# Patient Record
Sex: FEMALE | Race: ASIAN | Employment: FULL TIME | URBAN - METROPOLITAN AREA
[De-identification: names, ages, dates, MRNs, and addresses within clinical notes are randomized per-mention and may not be internally consistent; named-entity substitution may affect disease eponyms.]

---

## 2024-07-10 DIAGNOSIS — E03.9 HYPOTHYROIDISM, UNSPECIFIED TYPE: ICD-10-CM

## 2024-07-10 DIAGNOSIS — I10 HYPERTENSION, UNSPECIFIED TYPE: Primary | ICD-10-CM

## 2024-07-22 RX ORDER — AMLODIPINE BESYLATE 5 MG/1
5 TABLET ORAL DAILY
Qty: 90 TABLET | Refills: 1 | Status: SHIPPED | OUTPATIENT
Start: 2024-07-22 | End: 2024-07-26

## 2024-07-22 RX ORDER — LEVOTHYROXINE SODIUM 75 UG/1
75 TABLET ORAL DAILY
Qty: 90 TABLET | Refills: 1 | Status: SHIPPED | OUTPATIENT
Start: 2024-07-22 | End: 2024-07-26

## 2024-07-25 DIAGNOSIS — I10 HYPERTENSION, UNSPECIFIED TYPE: ICD-10-CM

## 2024-07-25 DIAGNOSIS — E03.9 HYPOTHYROIDISM, UNSPECIFIED TYPE: ICD-10-CM

## 2024-07-25 RX ORDER — LEVOTHYROXINE SODIUM 75 UG/1
75 TABLET ORAL DAILY
Qty: 90 TABLET | Refills: 1 | OUTPATIENT
Start: 2024-07-25

## 2024-07-25 RX ORDER — AMLODIPINE BESYLATE 5 MG/1
5 TABLET ORAL DAILY
Qty: 90 TABLET | Refills: 1 | OUTPATIENT
Start: 2024-07-25

## 2024-07-25 NOTE — TELEPHONE ENCOUNTER
Patient is scheduled.  It shows in her chart that we sent these in but patient called stating that [facility] did not have the RX's.  I called [facility] and they verified that they did not receive the prescriptions so can you please resend.

## 2024-07-26 DIAGNOSIS — E03.9 HYPOTHYROIDISM, UNSPECIFIED TYPE: ICD-10-CM

## 2024-07-26 DIAGNOSIS — I10 HYPERTENSION, UNSPECIFIED TYPE: ICD-10-CM

## 2024-07-26 RX ORDER — LEVOTHYROXINE SODIUM 75 UG/1
75 TABLET ORAL DAILY
Qty: 90 TABLET | Refills: 1 | Status: CANCELLED | OUTPATIENT
Start: 2024-07-26

## 2024-07-26 RX ORDER — AMLODIPINE BESYLATE 5 MG/1
5 TABLET ORAL DAILY
Qty: 90 TABLET | Refills: 0 | Status: SHIPPED | OUTPATIENT
Start: 2024-07-26

## 2024-07-26 RX ORDER — LEVOTHYROXINE SODIUM 75 UG/1
75 TABLET ORAL DAILY
Qty: 90 TABLET | Refills: 0 | Status: SHIPPED | OUTPATIENT
Start: 2024-07-26

## 2024-07-26 RX ORDER — AMLODIPINE BESYLATE 5 MG/1
5 TABLET ORAL DAILY
Qty: 90 TABLET | Refills: 1 | Status: CANCELLED | OUTPATIENT
Start: 2024-07-26

## 2024-07-26 NOTE — TELEPHONE ENCOUNTER
Can you please pend Amlodipine and levothyroxine to Doc please.  I went back and talked with him and he said to pend to him again.

## 2024-12-16 ENCOUNTER — TELEPHONE (OUTPATIENT)
Dept: PRIMARY CARE | Facility: CLINIC | Age: 64
End: 2024-12-16
Payer: COMMERCIAL

## 2024-12-16 DIAGNOSIS — I10 HYPERTENSION, UNSPECIFIED TYPE: ICD-10-CM

## 2024-12-16 DIAGNOSIS — Z00.00 WELLNESS EXAMINATION: ICD-10-CM

## 2024-12-16 DIAGNOSIS — R73.03 PRE-DIABETES: Primary | ICD-10-CM

## 2024-12-16 DIAGNOSIS — E11.9 TYPE 2 DIABETES MELLITUS WITHOUT COMPLICATION, WITHOUT LONG-TERM CURRENT USE OF INSULIN (MULTI): ICD-10-CM

## 2024-12-18 ENCOUNTER — LAB (OUTPATIENT)
Dept: LAB | Facility: LAB | Age: 64
End: 2024-12-18
Payer: COMMERCIAL

## 2024-12-18 DIAGNOSIS — I10 HYPERTENSION, UNSPECIFIED TYPE: ICD-10-CM

## 2024-12-18 DIAGNOSIS — Z00.00 WELLNESS EXAMINATION: ICD-10-CM

## 2024-12-18 DIAGNOSIS — E11.9 TYPE 2 DIABETES MELLITUS WITHOUT COMPLICATION, WITHOUT LONG-TERM CURRENT USE OF INSULIN (MULTI): ICD-10-CM

## 2024-12-18 DIAGNOSIS — R73.03 PRE-DIABETES: ICD-10-CM

## 2024-12-18 LAB
ALBUMIN SERPL BCP-MCNC: 4.2 G/DL (ref 3.4–5)
ALP SERPL-CCNC: 67 U/L (ref 33–136)
ALT SERPL W P-5'-P-CCNC: 22 U/L (ref 7–45)
ANION GAP SERPL CALC-SCNC: 13 MMOL/L (ref 10–20)
AST SERPL W P-5'-P-CCNC: 18 U/L (ref 9–39)
BILIRUB SERPL-MCNC: 0.7 MG/DL (ref 0–1.2)
BUN SERPL-MCNC: 20 MG/DL (ref 6–23)
CALCIUM SERPL-MCNC: 9.3 MG/DL (ref 8.6–10.3)
CHLORIDE SERPL-SCNC: 105 MMOL/L (ref 98–107)
CHOLEST SERPL-MCNC: 220 MG/DL (ref 0–199)
CHOLESTEROL/HDL RATIO: 3.5
CO2 SERPL-SCNC: 27 MMOL/L (ref 21–32)
CREAT SERPL-MCNC: 0.7 MG/DL (ref 0.5–1.05)
EGFRCR SERPLBLD CKD-EPI 2021: >90 ML/MIN/1.73M*2
ERYTHROCYTE [DISTWIDTH] IN BLOOD BY AUTOMATED COUNT: 13.6 % (ref 11.5–14.5)
EST. AVERAGE GLUCOSE BLD GHB EST-MCNC: 108 MG/DL
GLUCOSE SERPL-MCNC: 91 MG/DL (ref 74–99)
HBA1C MFR BLD: 5.4 %
HCT VFR BLD AUTO: 49.9 % (ref 36–46)
HDLC SERPL-MCNC: 62.6 MG/DL
HGB BLD-MCNC: 15.8 G/DL (ref 12–16)
LDLC SERPL CALC-MCNC: 133 MG/DL
MCH RBC QN AUTO: 27.6 PG (ref 26–34)
MCHC RBC AUTO-ENTMCNC: 31.7 G/DL (ref 32–36)
MCV RBC AUTO: 87 FL (ref 80–100)
NON HDL CHOLESTEROL: 157 MG/DL (ref 0–149)
NRBC BLD-RTO: 0 /100 WBCS (ref 0–0)
PLATELET # BLD AUTO: 251 X10*3/UL (ref 150–450)
POTASSIUM SERPL-SCNC: 3.7 MMOL/L (ref 3.5–5.3)
PROT SERPL-MCNC: 7.3 G/DL (ref 6.4–8.2)
RBC # BLD AUTO: 5.73 X10*6/UL (ref 4–5.2)
SODIUM SERPL-SCNC: 141 MMOL/L (ref 136–145)
TRIGL SERPL-MCNC: 124 MG/DL (ref 0–149)
TSH SERPL-ACNC: 0.93 MIU/L (ref 0.44–3.98)
VLDL: 25 MG/DL (ref 0–40)
WBC # BLD AUTO: 6.1 X10*3/UL (ref 4.4–11.3)

## 2024-12-18 PROCEDURE — 80053 COMPREHEN METABOLIC PANEL: CPT

## 2024-12-18 PROCEDURE — 36415 COLL VENOUS BLD VENIPUNCTURE: CPT

## 2024-12-18 PROCEDURE — 85027 COMPLETE CBC AUTOMATED: CPT

## 2024-12-18 PROCEDURE — 80061 LIPID PANEL: CPT

## 2024-12-18 PROCEDURE — 84443 ASSAY THYROID STIM HORMONE: CPT

## 2024-12-18 PROCEDURE — 83036 HEMOGLOBIN GLYCOSYLATED A1C: CPT

## 2024-12-19 ENCOUNTER — APPOINTMENT (OUTPATIENT)
Dept: PRIMARY CARE | Facility: CLINIC | Age: 64
End: 2024-12-19
Payer: COMMERCIAL

## 2024-12-19 VITALS
HEART RATE: 76 BPM | SYSTOLIC BLOOD PRESSURE: 124 MMHG | HEIGHT: 61 IN | WEIGHT: 168 LBS | DIASTOLIC BLOOD PRESSURE: 74 MMHG | OXYGEN SATURATION: 95 % | BODY MASS INDEX: 31.72 KG/M2

## 2024-12-19 DIAGNOSIS — Z12.4 PAP SMEAR FOR CERVICAL CANCER SCREENING: ICD-10-CM

## 2024-12-19 DIAGNOSIS — Z00.00 ANNUAL PHYSICAL EXAM: Primary | ICD-10-CM

## 2024-12-19 DIAGNOSIS — Z12.31 ENCOUNTER FOR SCREENING MAMMOGRAM FOR MALIGNANT NEOPLASM OF BREAST: ICD-10-CM

## 2024-12-19 PROCEDURE — 99396 PREV VISIT EST AGE 40-64: CPT

## 2024-12-19 PROCEDURE — 3008F BODY MASS INDEX DOCD: CPT

## 2024-12-19 ASSESSMENT — ENCOUNTER SYMPTOMS
CONFUSION: 0
NERVOUS/ANXIOUS: 0
HEMATURIA: 0
BLOOD IN STOOL: 0

## 2024-12-19 ASSESSMENT — PATIENT HEALTH QUESTIONNAIRE - PHQ9
2. FEELING DOWN, DEPRESSED OR HOPELESS: NOT AT ALL
SUM OF ALL RESPONSES TO PHQ9 QUESTIONS 1 AND 2: 0
1. LITTLE INTEREST OR PLEASURE IN DOING THINGS: NOT AT ALL

## 2024-12-19 NOTE — PROGRESS NOTES
Chief Complaint   Reny Dangelo is a 64 y.o. female who presents for annual physical.   Patient denies any recent hospitalizations, surgeries or ER visits.  Denies any complaints or concerns today.  ROS reviewed below.    ROS:  Review of Systems   Constitutional:  Negative for chills, fatigue and fever.   HENT:  Negative for congestion, ear pain and rhinorrhea.    Eyes:  Negative for visual disturbance.   Respiratory:  Negative for cough, chest tightness, shortness of breath and wheezing.    Cardiovascular:  Negative for chest pain, palpitations and leg swelling.   Gastrointestinal:  Negative for abdominal pain, blood in stool, constipation and diarrhea.   Genitourinary:  Negative for dysuria, frequency, hematuria and urgency.   Musculoskeletal:  Negative for joint swelling.   Skin:  Negative for rash.   Neurological:  Negative for syncope, weakness, numbness and headaches.   Psychiatric/Behavioral:  Negative for confusion. The patient is not nervous/anxious.          PMH:  No past medical history on file.      PSH/Trauma:  No past surgical history on file.      Meds:    Current Outpatient Medications:     amLODIPine (Norvasc) 5 mg tablet, TAKE ONE TABLET BY MOUTH DAILY, Disp: 90 tablet, Rfl: 0    levothyroxine (Synthroid, Levoxyl) 75 mcg tablet, TAKE ONE TABLET BY MOUTH DAILY, Disp: 90 tablet, Rfl: 0    Allergies:  Allergies   Allergen Reactions    Penicillins Unknown       SH:  Social Drivers of Health     Tobacco Use: Low Risk  (12/19/2024)    Patient History     Smoking Tobacco Use: Never     Smokeless Tobacco Use: Never     Passive Exposure: Not on file   Alcohol Use: Not on file   Financial Resource Strain: Not on file   Food Insecurity: Not on file   Transportation Needs: Not on file   Physical Activity: Not on file   Stress: Not on file   Social Connections: Not on file   Intimate Partner Violence: Not on file   Depression: Not at risk (12/19/2024)    PHQ-2     PHQ-2 Score: 0   Housing Stability:  "Not on file   Utilities: Not on file   Digital Equity: Not on file   Health Literacy: Not on file       FH:  No family history on file.     Preventatives:  Colonoscopy- Due 2026  Mammogram- Due  Papsmear- Due    Health Maintenance   Topic Date Due    Yearly Adult Physical  Never done    HIV Screening  Never done    MMR Vaccines (1 of 1 - Standard series) Never done    Diabetes: Retinopathy Screening  Never done    Hepatitis C Screening  Never done    Diabetes: Urine Protein Screening  Never done    Pneumococcal Vaccine (1 of 2 - PCV) Never done    DTaP/Tdap/Td Vaccines (1 - Tdap) Never done    Zoster Vaccines (1 of 2) Never done    Cervical Cancer Screening  07/17/2011    Mammogram  11/17/2018    Bone Density Scan  01/10/2024    Influenza Vaccine (1) 09/01/2024    COVID-19 Vaccine (2 - 2024-25 season) 09/01/2024    Diabetes: Hemoglobin A1C  03/18/2025    TSH Level  12/18/2025    Lipid Panel  12/18/2025    Colorectal Cancer Screening  12/15/2026    RSV High Risk: (Elderly (60+) or Pregnant Population) (1 - 1-dose 75+ series) 06/03/2035    HIB Vaccines  Aged Out    Hepatitis B Vaccines  Aged Out    IPV Vaccines  Aged Out    Hepatitis A Vaccines  Aged Out    Meningococcal Vaccine  Aged Out    Rotavirus Vaccines  Aged Out    HPV Vaccines  Aged Out     ASCVD risk score The 10-year ASCVD risk score (Sumeet BELL, et al., 2019) is: 11.8%    Values used to calculate the score:      Age: 64 years      Sex: Female      Is Non- : No      Diabetic: Yes      Tobacco smoker: No      Systolic Blood Pressure: 124 mmHg      Is BP treated: Yes      HDL Cholesterol: 62.6 mg/dL      Total Cholesterol: 220 mg/dL    PE:  Visit Vitals  /74   Pulse 76   Ht 1.549 m (5' 1\")   Wt 76.2 kg (168 lb)   SpO2 95%   BMI 31.74 kg/m²   Smoking Status Never   BSA 1.81 m²     Physical Exam  Constitutional:       Appearance: Normal appearance.   HENT:      Head: Normocephalic and atraumatic.   Eyes:      Extraocular Movements: " Extraocular movements intact.      Pupils: Pupils are equal, round, and reactive to light.   Cardiovascular:      Rate and Rhythm: Normal rate and regular rhythm.   Pulmonary:      Effort: Pulmonary effort is normal.      Breath sounds: Normal breath sounds.   Abdominal:      General: There is no distension.      Palpations: Abdomen is soft.      Tenderness: There is no abdominal tenderness. There is no guarding or rebound.   Musculoskeletal:         General: Normal range of motion.   Skin:     General: Skin is warm and dry.      Capillary Refill: Capillary refill takes less than 2 seconds.   Neurological:      General: No focal deficit present.      Mental Status: She is alert and oriented to person, place, and time. Mental status is at baseline.   Psychiatric:         Mood and Affect: Mood normal.         Behavior: Behavior normal.         Thought Content: Thought content normal.         Judgment: Judgment normal.         Assessment/Plan  Reny was seen today for annual exam.  Diagnoses and all orders for this visit:  Annual physical exam (Primary)  Encounter for screening mammogram for malignant neoplasm of breast  -     BI mammo bilateral screening tomosynthesis; Future  Pap smear for cervical cancer screening  -     Referral to Obstetrics / Gynecology; Future         Reny Dangelo is a 64 y.o. female who presents for annual physical. Medications were reviewed and refills were ordered and sent to preferred pharmacy as needed. Annual lab work orders were placed. Will follow up with results. Discussed appropriate annual screenings and placed orders per patients preference. Will follow up with patient in one year for annual physical or sooner if needed.    Patient was staffed with Dr. Syd Lawrence DO, PGY-3  Formerly [facility]

## 2024-12-21 ASSESSMENT — ENCOUNTER SYMPTOMS
PALPITATIONS: 0
RHINORRHEA: 0
DIARRHEA: 0
CHEST TIGHTNESS: 0
FEVER: 0
ABDOMINAL PAIN: 0
FATIGUE: 0
DYSURIA: 0
NUMBNESS: 0
CONSTIPATION: 0
HEADACHES: 0
WHEEZING: 0
CHILLS: 0
FREQUENCY: 0
WEAKNESS: 0
SHORTNESS OF BREATH: 0
COUGH: 0
JOINT SWELLING: 0

## 2024-12-23 NOTE — PROGRESS NOTES
I reviewed and examined the patient. I was present for the key exam elements, and I fully participated in the patient's care. I discussed the management of the care with the resident. I have personally reviewed the pertinent labs and imaging, as well as recent notes, with the patient. I have reviewed the note above and agree with the resident's medical decision making as documented in the resident's note, in addition to the following comments / findings:     Agree with the rest of the plan outlined below by resident physician. No red flags.      The patient understands and agrees to the assessment and plan of care. Patient has also agreed to follow up and comply with the treatment and evaluation as recommended today. Patient was instructed to call the office at [phone] should questions arise regarding their treatment or care.     Mann Velarde DO, FAOASM  Family Medicine   [address], [address]  [place] [address]     Mann Velarde DO

## 2024-12-24 ENCOUNTER — HOSPITAL ENCOUNTER (OUTPATIENT)
Dept: RADIOLOGY | Facility: HOSPITAL | Age: 64
Discharge: HOME | End: 2024-12-24
Payer: COMMERCIAL

## 2024-12-24 ENCOUNTER — HOSPITAL ENCOUNTER (OUTPATIENT)
Dept: RADIOLOGY | Facility: EXTERNAL LOCATION | Age: 64
Discharge: HOME | End: 2024-12-24

## 2024-12-24 VITALS — HEIGHT: 61 IN | BODY MASS INDEX: 31.15 KG/M2 | WEIGHT: 165 LBS

## 2024-12-24 DIAGNOSIS — Z12.31 ENCOUNTER FOR SCREENING MAMMOGRAM FOR MALIGNANT NEOPLASM OF BREAST: ICD-10-CM

## 2024-12-24 PROCEDURE — 77067 SCR MAMMO BI INCL CAD: CPT

## 2024-12-24 PROCEDURE — 77063 BREAST TOMOSYNTHESIS BI: CPT | Performed by: RADIOLOGY

## 2024-12-24 PROCEDURE — 77067 SCR MAMMO BI INCL CAD: CPT | Performed by: RADIOLOGY

## 2024-12-28 ENCOUNTER — APPOINTMENT (OUTPATIENT)
Dept: RADIOLOGY | Facility: HOSPITAL | Age: 64
End: 2024-12-28
Payer: COMMERCIAL

## 2024-12-28 ENCOUNTER — HOSPITAL ENCOUNTER (EMERGENCY)
Facility: HOSPITAL | Age: 64
Discharge: HOME | End: 2024-12-28
Payer: COMMERCIAL

## 2024-12-28 VITALS
RESPIRATION RATE: 18 BRPM | OXYGEN SATURATION: 95 % | HEIGHT: 61 IN | WEIGHT: 165 LBS | SYSTOLIC BLOOD PRESSURE: 168 MMHG | BODY MASS INDEX: 31.15 KG/M2 | DIASTOLIC BLOOD PRESSURE: 107 MMHG | TEMPERATURE: 97.2 F | HEART RATE: 77 BPM

## 2024-12-28 DIAGNOSIS — S52.121A CLOSED DISPLACED FRACTURE OF HEAD OF RIGHT RADIUS, INITIAL ENCOUNTER: Primary | ICD-10-CM

## 2024-12-28 PROCEDURE — 2500000004 HC RX 250 GENERAL PHARMACY W/ HCPCS (ALT 636 FOR OP/ED): Performed by: NURSE PRACTITIONER

## 2024-12-28 PROCEDURE — 2500000001 HC RX 250 WO HCPCS SELF ADMINISTERED DRUGS (ALT 637 FOR MEDICARE OP): Performed by: NURSE PRACTITIONER

## 2024-12-28 PROCEDURE — 99284 EMERGENCY DEPT VISIT MOD MDM: CPT | Mod: 25

## 2024-12-28 PROCEDURE — 29105 APPLICATION LONG ARM SPLINT: CPT | Performed by: NURSE PRACTITIONER

## 2024-12-28 PROCEDURE — 90471 IMMUNIZATION ADMIN: CPT | Performed by: NURSE PRACTITIONER

## 2024-12-28 PROCEDURE — 73080 X-RAY EXAM OF ELBOW: CPT | Mod: RT

## 2024-12-28 PROCEDURE — 29105 APPLICATION LONG ARM SPLINT: CPT | Mod: RT | Performed by: NURSE PRACTITIONER

## 2024-12-28 PROCEDURE — 73080 X-RAY EXAM OF ELBOW: CPT | Mod: RIGHT SIDE | Performed by: RADIOLOGY

## 2024-12-28 PROCEDURE — 73110 X-RAY EXAM OF WRIST: CPT | Mod: RIGHT SIDE | Performed by: RADIOLOGY

## 2024-12-28 PROCEDURE — 73110 X-RAY EXAM OF WRIST: CPT | Mod: RT

## 2024-12-28 PROCEDURE — 90715 TDAP VACCINE 7 YRS/> IM: CPT | Performed by: NURSE PRACTITIONER

## 2024-12-28 RX ORDER — OXYCODONE AND ACETAMINOPHEN 5; 325 MG/1; MG/1
1 TABLET ORAL ONCE
Status: COMPLETED | OUTPATIENT
Start: 2024-12-28 | End: 2024-12-28

## 2024-12-28 RX ORDER — HYDROCODONE BITARTRATE AND ACETAMINOPHEN 5; 325 MG/1; MG/1
1 TABLET ORAL EVERY 6 HOURS PRN
Qty: 12 TABLET | Refills: 0 | Status: SHIPPED | OUTPATIENT
Start: 2024-12-28 | End: 2024-12-31

## 2024-12-28 RX ADMIN — OXYCODONE HYDROCHLORIDE AND ACETAMINOPHEN 1 TABLET: 5; 325 TABLET ORAL at 13:41

## 2024-12-28 RX ADMIN — TETANUS TOXOID, REDUCED DIPHTHERIA TOXOID AND ACELLULAR PERTUSSIS VACCINE, ADSORBED 0.5 ML: 5; 2.5; 8; 8; 2.5 SUSPENSION INTRAMUSCULAR at 13:41

## 2024-12-28 ASSESSMENT — COLUMBIA-SUICIDE SEVERITY RATING SCALE - C-SSRS
2. HAVE YOU ACTUALLY HAD ANY THOUGHTS OF KILLING YOURSELF?: NO
6. HAVE YOU EVER DONE ANYTHING, STARTED TO DO ANYTHING, OR PREPARED TO DO ANYTHING TO END YOUR LIFE?: NO
1. IN THE PAST MONTH, HAVE YOU WISHED YOU WERE DEAD OR WISHED YOU COULD GO TO SLEEP AND NOT WAKE UP?: NO

## 2024-12-28 ASSESSMENT — PAIN SCALES - GENERAL: PAINLEVEL_OUTOF10: 6

## 2024-12-28 ASSESSMENT — LIFESTYLE VARIABLES
TOTAL SCORE: 0
EVER HAD A DRINK FIRST THING IN THE MORNING TO STEADY YOUR NERVES TO GET RID OF A HANGOVER: NO
HAVE PEOPLE ANNOYED YOU BY CRITICIZING YOUR DRINKING: NO
EVER FELT BAD OR GUILTY ABOUT YOUR DRINKING: NO
HAVE YOU EVER FELT YOU SHOULD CUT DOWN ON YOUR DRINKING: NO

## 2024-12-28 ASSESSMENT — PAIN - FUNCTIONAL ASSESSMENT: PAIN_FUNCTIONAL_ASSESSMENT: 0-10

## 2024-12-28 ASSESSMENT — PAIN DESCRIPTION - PAIN TYPE: TYPE: ACUTE PAIN

## 2024-12-28 ASSESSMENT — PAIN DESCRIPTION - ORIENTATION: ORIENTATION: RIGHT

## 2024-12-28 ASSESSMENT — PAIN DESCRIPTION - LOCATION: LOCATION: ELBOW

## 2024-12-28 NOTE — ED TRIAGE NOTES
Pt presented to the ED with c/o right arm injury. Pt was walking her dog today when the dog tugged on the leash pulling her forward. Pt denies hitting her head, LOC or being on blood thinners. Pt states she had pain in her right elbow and wrist. No obvious deformities and MSP intact. Pain 6/10. Pt ambulated back to ED chair and given pillow for support and ice pack.

## 2024-12-28 NOTE — ED PROVIDER NOTES
Emergency Department Encounter  [facility] EMERGENCY MEDICINE    Patient: Reny Dangelo  MRN: [id number]  : 1960  Date of Evaluation: 2024  ED Provider: MARK Rosen      Chief Complaint       Chief Complaint   Patient presents with    Arm Injury     Right arm     [place]    (Location/Symptom, Timing/Onset, Context/Setting, Quality, Duration, Modifying Factors, Severity) Note limiting factors.   Limitations to History: none  Historian: self  Records reviewed: EMR inpatient and outpatient notes, Care Everywhere      Reny Dangelo is a 64 y.o. female who presents to the emergency department complaining of right elbow injury after walking her dog and she tripped and fell, onto the right elbow, no head injury, neck pain.  Denies any paresthesias, unable to fully extend the right arm, is right-hand dominant.  Does not take any anticoagulants.  Also with a superficial abrasion to the left knee.    ROS:     Review of Systems  14 systems reviewed and otherwise acutely negative except as in the [place].          Past History   History reviewed. No pertinent past medical history.  History reviewed. No pertinent surgical history.  Social History     Socioeconomic History    Marital status:    Tobacco Use    Smoking status: Never    Smokeless tobacco: Never       Medications/Allergies     Previous Medications    AMLODIPINE (NORVASC) 5 MG TABLET    TAKE ONE TABLET BY MOUTH DAILY    LEVOTHYROXINE (SYNTHROID, LEVOXYL) 75 MCG TABLET    TAKE ONE TABLET BY MOUTH DAILY     Allergies   Allergen Reactions    Penicillins Unknown        Physical Exam       ED Triage Vitals [24 1252]   Temperature Heart Rate Respirations BP   36.2 °C (97.2 °F) 77 18 (!) 168/107      Pulse Ox Temp Source Heart Rate Source Patient Position   95 % Temporal Monitor Sitting      BP Location FiO2 (%)     -- --         Physical Exam    GENERAL:  The patient appears nourished and normally developed.  Vital signs as documented.     HEENT:  Head normocephalic, atraumatic, EOMs intact, PERRLA, Mucous membranes moist. Nares patent without copious rhinorrhea.  No lymphadenopathy.    PULMONARY:  Lungs are clear to auscultation, without any respiratory distress. Able to speak full sentences, no accessory muscle use    CARDIAC:   Normal rate. No murmurs, rubs or gallops    ABDOMEN:  Soft, non distended, non tender, BS positive x 4 quadrants, No rebound or guarding, no peritoneal signs, no CVA tenderness, no masses or organomegaly    MUSCULOSKELETAL:   Able to ambulate, mild tenderness to the right elbow and unable to fully extend right arm, pulses intact distal    SKIN:   Good color, with no significant rashes.  No pallor.  Superficial abrasion anterior left knee    NEURO:  No obvious neurological deficits, normal sensation and strength bilaterally.  Able to follow commands, NIH 0, CN 2-12 intact.        Diagnostics   Labs:  Labs Reviewed - No data to display  Radiographs:  XR wrist right 3+ views   Final Result   1. No evidence of acute fracture or dislocation.        MACRO:   None.        Signed by: Yassine Munguia 12/28/2024 1:20 PM   Dictation workstation:   [id number]      XR elbow right 3+ views   Final Result   1.  Right radial head fracture, as above.   2. Avulsion fracture fragment adjacent to the radial neck, as above.        MACRO:   None        Signed by: Yassine Munguia 12/28/2024 1:22 PM   Dictation workstation:   [id number]                Assessment   In brief, Reny Dangelo is a 64 y.o. female who presented to the emergency department with right elbow pain, injury status post mechanical fall    Plan   Imaging, analgesics    Differentials   Fracture  Dislocation  Contusion    ED Course     Diagnoses as of 12/28/24 1346   Closed displaced fracture of head of right radius, initial encounter       Visit Vitals  BP (!) 168/107 (Patient Position: Sitting)   Pulse 77   Temp 36.2 °C (97.2 °F) (Temporal)  "  Resp 18   Ht 1.549 m (5' 1\")   Wt 74.8 kg (165 lb)   SpO2 95%   BMI 31.18 kg/m²   OB Status Hysterectomy   Smoking Status Never   BSA 1.79 m²       Medications   oxyCODONE-acetaminophen (Percocet) 5-325 mg per tablet 1 tablet (1 tablet oral Given 12/28/24 1341)   diphth,pertus(acell),tetanus (BoostRIX) 2.5-8-5 Lf-mcg-Lf/0.5mL vaccine 0.5 mL (0.5 mL intramuscular Given 12/28/24 1341)       Plan of care discussed, patient stable appearing, in no distress, given Percocet for pain, sent for imaging, tetanus was updated, patient was notified of results of fracture of the radial head on the right side, consulted orthopedics and recommendations are for posterior long-arm splint, sling, follow-up with Dr. Lr at within the next week, will be discharged home in stable condition, is neurovascularly intact pre and post splint application, educated on any worsening signs and symptoms to return to the emergency department      Final Impression      1. Closed displaced fracture of head of right radius, initial encounter          DISPOSITION  Disposition: Discharge  Patient condition is: Stable    Comment: Please note this report has been produced using speech recognition software and may contain errors related to that system including errors in grammar, punctuation, and spelling, as well as words and phrases that may be inappropriate.  If there are any questions or concerns please feel free to contact the dictating provider for clarification.    MARK Rosen APRN-CNP  12/28/24 1342    "

## 2024-12-28 NOTE — ED PROCEDURE NOTE
Procedure  Splint Application    Performed by: MARK Rosen  Authorized by: MARK Rosen    Consent:     Consent obtained:  Verbal    Consent given by:  Patient    Risks, benefits, and alternatives were discussed: yes      Risks discussed:  Discoloration, numbness, pain and swelling    Alternatives discussed:  No treatment, delayed treatment, alternative treatment, observation and referral  Universal protocol:     Procedure explained and questions answered to patient or proxy's satisfaction: yes      Imaging studies available: yes      Patient identity confirmed:  Verbally with patient  Pre-procedure details:     Distal neurologic exam:  Normal    Distal perfusion: distal pulses strong and brisk capillary refill    Procedure details:     Location:  Elbow    Elbow location:  R elbow    Cast type:  Long arm    Splint type:  Long arm    Supplies:  Fiberglass, sling and cotton padding  Post-procedure details:     Distal neurologic exam:  Normal    Distal perfusion: distal pulses strong      Procedure completion:  Tolerated well, no immediate complications    Post-procedure imaging: reviewed                 MARK Rosen  12/28/24 0440

## 2025-01-14 DIAGNOSIS — I10 HYPERTENSION, UNSPECIFIED TYPE: ICD-10-CM

## 2025-01-14 DIAGNOSIS — E03.9 HYPOTHYROIDISM, UNSPECIFIED TYPE: ICD-10-CM

## 2025-01-14 RX ORDER — LEVOTHYROXINE SODIUM 75 UG/1
75 TABLET ORAL DAILY
Qty: 90 TABLET | Refills: 3 | Status: SHIPPED | OUTPATIENT
Start: 2025-01-14

## 2025-01-14 RX ORDER — AMLODIPINE BESYLATE 5 MG/1
5 TABLET ORAL DAILY
Qty: 90 TABLET | Refills: 3 | Status: SHIPPED | OUTPATIENT
Start: 2025-01-14

## 2025-03-11 ENCOUNTER — APPOINTMENT (OUTPATIENT)
Dept: OBSTETRICS AND GYNECOLOGY | Facility: CLINIC | Age: 65
End: 2025-03-11
Payer: COMMERCIAL

## 2025-07-25 DIAGNOSIS — E66.9 OBESITY, UNSPECIFIED CLASS, UNSPECIFIED OBESITY TYPE, UNSPECIFIED WHETHER SERIOUS COMORBIDITY PRESENT: Primary | ICD-10-CM

## 2025-08-01 NOTE — PROGRESS NOTES
"  Clinical Pharmacy Appointment    Patient ID: Reny Dangelo is a 65 y.o. female who presents for No chief complaint on file..    Pt is here for First appointment.     Referring Provider: Mann Velarde DO  PCP: Mann Velarde DO  Last visit with PCP: 12/19/24   Next visit with PCP: 12/19/25    Subjective   Interval History   Mentions history of thyroidectomy (states she only has \"1 lobe left\"   ***    Medication Reconciliation:  Changed: none  Added: none  Discontinued: none    Drug Interactions  No relevant drug interactions were noted.    Medication System Management  Patient's preferred pharmacy: ***  Adherence/Organization: ***  Affordability/Accessibility: ***  Test Billing:  Mounjaro, Trulicity, Ozempic require PA  Zepbound, Wegovy non-formulary     HPI  WEIGHT LOSS  BMI Readings from Last 3 Encounters:   12/28/24 31.18 kg/m²   12/24/24 31.18 kg/m²   12/19/24 31.74 kg/m²      Starting weight: 165 lbs. (12/2024)  Current weight: *** lbs.    Weight Goals  Next goal: {Weight goal steps:98027}  Maintenance BMI Goal: {BMI Goal:70097}  Maintenance Weight Goal: *** to *** lbs.  Patient defined goal(s): ***    Lifestyle  Diet: *** meals/day. ***  BK: ***  LN: ***  DN: ***  Snacks: ***  Drinks: ***  Has worked with nutritionist/dietician? {Yes/No:95095}  Has worked with weight management? {Yes/No:75294}  Exercise:   ***  Is limited by: {Exercise limitations:89221}    Other Potential Contributing Factors  Alcohol use: Average *** drinks/week  Tobacco use: {YES wildcard/NO:60}  Other drug use: {YES wildcard/NO:60}  Medications that may cause weight gain: {meds:61945}  Mental health: {Mental Health Status:72553}  Comorbidities: Hypertension and ***    The 10-year ASCVD risk score (Sumeet BELL, et al., 2019) is: 22.8%    Values used to calculate the score:      Age: 65 years      Clincally relevant sex: Female      Is Non- : No      Diabetic: Yes      Tobacco smoker: No      Systolic Blood " Pressure: 168 mmHg      Is BP treated: Yes      HDL Cholesterol: 62.6 mg/dL      Total Cholesterol: 220 mg/dL      Pertinent PMH Review:  PMH of Pancreatitis: {YES,NO:16919}  PMH of Retinopathy: {YES,NO:93830}  PMH of MTC: {YES,NO:17421}  PMH of MEN2: {YES,NO:83548}    Non-Pharmacological Therapy  Weight loss techniques attempted:  {Weight loss methods:05218}    Pharmacological Therapy  No current medications for weight loss    Insurance coverage of weight-loss medications? No, non-formulary  Eligible for copay cards/programs? No, could consider MAP, currently on medicare insurance    Medication Estimated Cost  Without Insurance Expected weight loss Patient Risks/Cautions Notes   Wegovy (semaglutide) $499/month   w/ savings card 10-20% {GLP1 Cautions:70741}      Zepbound (tirzepatide) $499/month vials via [email or web address].  $650/month pens w/ savings card. 15-25%     Saxenda (liraglutide) ~$1300/month 5-15%     Compounded semaglutide + additives $199-300/month Limited data  Not FDA approved   Qsymia (phentermine-topiramate) $98/month via Qsymia [employer] 8-10% {Qsymia Cautions:70319} Controlled substance   Contrave (bupropion-naltrexone) $99/month   via [employer] 5-10% {Contrave Cautions:74776}    Adipex (phentermine) ~$15/month 3-5% {Adipex Cautions:60442} Controlled substance,  Short-term use only   Topiramate ~$15/month 3-8% {Topiramate Cautions:16707} Off-label for weight loss   Naltrexone ~$15/month Limited data {Naltrexone cautions:27837} Off-label for weight loss   Ryan/Xenical (OTC Orlistat) ~$60/month 3-5% {Orlistat Cautions:15330} Adverse effects may outweigh benefit.         Objective   Allergies[1]  Social History     Social History Narrative    Not on file      Medication Review  Current Outpatient Medications   Medication Instructions    amLODIPine (NORVASC) 5 mg, oral, Daily    levothyroxine (SYNTHROID, LEVOXYL) 75 mcg, oral, Daily      Vitals  BP Readings from Last 2 Encounters:   12/28/24 (!) 168/107  "  12/19/24 124/74     BMI Readings from Last 1 Encounters:   12/28/24 31.18 kg/m²      Labs  A1C  Lab Results   Component Value Date    HGBA1C 5.4 12/18/2024     BMP  Lab Results   Component Value Date    CALCIUM 9.3 12/18/2024     12/18/2024    K 3.7 12/18/2024    CO2 27 12/18/2024     12/18/2024    BUN 20 12/18/2024    CREATININE 0.70 12/18/2024    EGFR >90 12/18/2024     LFTs  Lab Results   Component Value Date    ALT 22 12/18/2024    AST 18 12/18/2024    ALKPHOS 67 12/18/2024    BILITOT 0.7 12/18/2024     FLP  Lab Results   Component Value Date    TRIG 124 12/18/2024    CHOL 220 (H) 12/18/2024    LDLCALC 133 (H) 12/18/2024    HDL 62.6 12/18/2024     Urine Microalbumin  No results found for: \"MICROALBCREA\"  Weight Management  Wt Readings from Last 3 Encounters:   12/28/24 74.8 kg (165 lb)   12/24/24 74.8 kg (165 lb)   12/19/24 76.2 kg (168 lb)      There is no height or weight on file to calculate BMI.     Assessment/Plan   Problem List Items Addressed This Visit    None    Current regimen includes ***. ***. Patient's current weight reported as ***. Has lost *** lbs (***% of TBW) since starting therapy plan. Due to ***, plan to ***.      Could consider [employer] cash pay [phone]      Medication Changes:  CONTINUE    STOP    START    INCREASE    DECREASE      Future Considerations:      Monitoring and Education:  {Patient Education:49652}          Clinical Pharmacist follow-up: as needed, Telehealth visit  Patient is not followed in CCM.    Continue all meds under the continuation of care with the referring provider and clinical pharmacy team.    Thank you,  Vida Arnold, PharmD  Clinical Pharmacy Specialist  [phone]    Verbal consent to manage patient's drug therapy was obtained from the patient. They were informed they may decline to participate or withdraw from participation in pharmacy services at any time.         [1]   Allergies  Allergen Reactions    Penicillins Unknown     " were informed they may decline to participate or withdraw from participation in pharmacy services at any time.         [1]   Allergies  Allergen Reactions    Penicillins Unknown

## 2025-08-04 ENCOUNTER — APPOINTMENT (OUTPATIENT)
Dept: PHARMACY | Facility: HOSPITAL | Age: 65
End: 2025-08-04
Payer: COMMERCIAL

## 2025-08-04 DIAGNOSIS — E66.9 OBESITY, UNSPECIFIED CLASS, UNSPECIFIED OBESITY TYPE, UNSPECIFIED WHETHER SERIOUS COMORBIDITY PRESENT: Primary | ICD-10-CM

## 2025-08-04 RX ORDER — TIRZEPATIDE 2.5 MG/.5ML
2.5 INJECTION, SOLUTION SUBCUTANEOUS WEEKLY
Qty: 2 ML | Refills: 0 | Status: SHIPPED | OUTPATIENT
Start: 2025-08-04 | End: 2025-08-05 | Stop reason: ALTCHOICE

## 2025-08-04 RX ORDER — TIRZEPATIDE 2.5 MG/.5ML
2.5 INJECTION, SOLUTION SUBCUTANEOUS WEEKLY
Qty: 2 ML | Refills: 0 | Status: SHIPPED | OUTPATIENT
Start: 2025-08-04 | End: 2025-08-04

## 2025-08-06 ENCOUNTER — TELEPHONE (OUTPATIENT)
Dept: PHARMACY | Facility: HOSPITAL | Age: 65
End: 2025-08-06
Payer: COMMERCIAL

## 2025-08-06 NOTE — TELEPHONE ENCOUNTER
Left voicemail to inform patient that her insurance company denied coverage for both Mounjaro and Zepbound.       Prior Authorization Request - DENIED 08/04/25  Submitted 08/04/25  YADY Valencia [id number]   Medication: Mounjaro 2.5mg injection     Prior Authorization Request - DENIED 08/06/25  Submitted 08/05/25  YADY Valencia [id number]   Medication: Zepbound 2.5mg injection     Vida Arnold PharmD  Clinical Pharmacy Specialist  [phone]

## 2025-08-19 ENCOUNTER — TELEPHONE (OUTPATIENT)
Facility: CLINIC | Age: 65
End: 2025-08-19
Payer: COMMERCIAL

## 2025-12-19 ENCOUNTER — APPOINTMENT (OUTPATIENT)
Facility: CLINIC | Age: 65
End: 2025-12-19
Payer: COMMERCIAL

## 2025-12-26 ENCOUNTER — APPOINTMENT (OUTPATIENT)
Dept: PRIMARY CARE | Facility: CLINIC | Age: 65
End: 2025-12-26
Payer: COMMERCIAL